# Patient Record
Sex: MALE | Race: WHITE | HISPANIC OR LATINO
[De-identification: names, ages, dates, MRNs, and addresses within clinical notes are randomized per-mention and may not be internally consistent; named-entity substitution may affect disease eponyms.]

---

## 2019-01-22 ENCOUNTER — TRANSCRIPTION ENCOUNTER (OUTPATIENT)
Age: 29
End: 2019-01-22

## 2019-06-19 ENCOUNTER — TRANSCRIPTION ENCOUNTER (OUTPATIENT)
Age: 29
End: 2019-06-19

## 2024-04-17 PROBLEM — Z00.00 ENCOUNTER FOR PREVENTIVE HEALTH EXAMINATION: Status: ACTIVE | Noted: 2024-04-17

## 2024-04-21 ENCOUNTER — NON-APPOINTMENT (OUTPATIENT)
Age: 34
End: 2024-04-21

## 2024-04-22 ENCOUNTER — NON-APPOINTMENT (OUTPATIENT)
Age: 34
End: 2024-04-22

## 2024-04-23 DIAGNOSIS — F41.9 ANXIETY DISORDER, UNSPECIFIED: ICD-10-CM

## 2024-04-23 DIAGNOSIS — Z87.19 PERSONAL HISTORY OF OTHER DISEASES OF THE DIGESTIVE SYSTEM: ICD-10-CM

## 2024-04-23 DIAGNOSIS — F32.A ANXIETY DISORDER, UNSPECIFIED: ICD-10-CM

## 2024-04-23 DIAGNOSIS — Z82.49 FAMILY HISTORY OF ISCHEMIC HEART DISEASE AND OTHER DISEASES OF THE CIRCULATORY SYSTEM: ICD-10-CM

## 2024-04-23 DIAGNOSIS — Q82.8 OTHER SPECIFIED CONGENITAL MALFORMATIONS OF SKIN: ICD-10-CM

## 2024-04-23 DIAGNOSIS — Z87.891 PERSONAL HISTORY OF NICOTINE DEPENDENCE: ICD-10-CM

## 2024-04-23 DIAGNOSIS — Z83.438 FAMILY HISTORY OF OTHER DISORDER OF LIPOPROTEIN METABOLISM AND OTHER LIPIDEMIA: ICD-10-CM

## 2024-04-23 DIAGNOSIS — Z78.9 OTHER SPECIFIED HEALTH STATUS: ICD-10-CM

## 2024-04-23 RX ORDER — GLYCOPYRROLATE 1 MG/1
1 TABLET ORAL
Refills: 0 | Status: ACTIVE | COMMUNITY

## 2024-04-23 RX ORDER — LORAZEPAM 1 MG/1
1 TABLET ORAL
Refills: 0 | Status: ACTIVE | COMMUNITY

## 2024-04-23 RX ORDER — FLUOXETINE HYDROCHLORIDE 10 MG/1
10 CAPSULE ORAL
Refills: 0 | Status: ACTIVE | COMMUNITY

## 2024-04-24 ENCOUNTER — APPOINTMENT (OUTPATIENT)
Dept: COLORECTAL SURGERY | Facility: CLINIC | Age: 34
End: 2024-04-24
Payer: COMMERCIAL

## 2024-04-24 VITALS
SYSTOLIC BLOOD PRESSURE: 124 MMHG | HEART RATE: 73 BPM | TEMPERATURE: 98.1 F | BODY MASS INDEX: 25.05 KG/M2 | WEIGHT: 173 LBS | DIASTOLIC BLOOD PRESSURE: 83 MMHG | HEIGHT: 69.5 IN

## 2024-04-24 PROCEDURE — 99203 OFFICE O/P NEW LOW 30 MIN: CPT

## 2024-04-24 NOTE — PLAN
[TextEntry] : - Will treat as a fissure for one month. - Fiber/water - Dilt/lido - Warm soaks - Cotton balls to keep area dry throughout the day. - RTC in 1 month.

## 2024-04-24 NOTE — PHYSICAL EXAM
[Abdomen Tenderness] : ~T No ~M abdominal tenderness [JVD] : no jugular venous distention  [Normal Breath Sounds] : Normal breath sounds [No Rash or Lesion] : No rash or lesion [Alert] : alert [Calm] : calm [de-identified] : Well appearing male in NAD [de-identified] : MMM [de-identified] : ROM WNL [FreeTextEntry1] : The pt was examined in the prone valorie-knife position with a medical assistant present for the entirety of the examination. Visual examination of the anal verge with effacement of the buttocks revealed a 1x 2cm ulceration of the posterior midline at the level of the verge with mildly rolled edges and no exposed sphincter and no signs of infection otherwise no masses, or skin rashes. Digital rectal exam revealed no palpable mass or blood with sphincter tone within normal limits. Anoscopy was deferred due to discomfort.  The patient tolerated the exam well.

## 2024-04-24 NOTE — HISTORY OF PRESENT ILLNESS
[FreeTextEntry1] : 34M presenting for consultation regarding a perianal wound/ulcer. Pt has a chronic hx of Pemphigus that normally leads to vesicles and subsequent ulcerations usually periorally, sometimes in the axilla or in the perineum however currently has been dealing with a posterior perianal ulceration since january. He reports discomfort with bowel movements and with wiping. Some spotted blood but no dripping. Denies straining or constipation. Evaluated by a Dermatologist who took a bx and r/o pemphigus. Has been infected twice and improved with topical gentamycin. Denies drainage but does report constant moisture.  PMH: IBS, Anxiety/Depression, Familial Benign Pemphigus (Ayanna Ayanna disease) Meds: Prozac, Glycopyrollate, Lorazepam All: NKDA Vaccine: Gardasil PSH: Denies FH: Denies CRC/IBD Cscope: 2011 WNL

## 2024-05-22 ENCOUNTER — APPOINTMENT (OUTPATIENT)
Dept: COLORECTAL SURGERY | Facility: CLINIC | Age: 34
End: 2024-05-22
Payer: COMMERCIAL

## 2024-05-22 VITALS
TEMPERATURE: 98.4 F | BODY MASS INDEX: 23.89 KG/M2 | HEIGHT: 69.5 IN | WEIGHT: 165 LBS | SYSTOLIC BLOOD PRESSURE: 142 MMHG | HEART RATE: 90 BPM | DIASTOLIC BLOOD PRESSURE: 80 MMHG

## 2024-05-22 PROCEDURE — 99212 OFFICE O/P EST SF 10 MIN: CPT

## 2024-05-22 NOTE — PHYSICAL EXAM
[Normal Breath Sounds] : Normal breath sounds [No Rash or Lesion] : No rash or lesion [Alert] : alert [Calm] : calm [Abdomen Tenderness] : ~T No ~M abdominal tenderness [JVD] : no jugular venous distention  [de-identified] : Well appearing male in NAD [de-identified] : MMM [de-identified] : ROM WNL [FreeTextEntry1] : The pt was examined in the prone valorie-knife position with a medical assistant present for the entirety of the examination. Visual examination of the anal verge with effacement of the buttocks revealed a nearly healed fissure of the posterior midline at the level of the verge without rolled edges and no exposed sphincter and no signs of infection otherwise no masses, or skin rashes. Digital rectal exam revealed no palpable mass or blood with sphincter tone within normal limits. Anoscopy was deferred due to discomfort.  The patient tolerated the exam well.

## 2024-05-22 NOTE — HISTORY OF PRESENT ILLNESS
[FreeTextEntry1] : 34M seen last month with a chronic perianal wound/ulcer. Pt has a chronic hx of Pemphigus that normally leads to vesicles and subsequent ulcerations usually periorally, sometimes in the axilla or in the perineum however currently has been dealing with a posterior perianal ulceration since january. On examination he was found to have a 1x2cm uleraction of the posterior midline at the level of the verge with mildly rolled edges and no exposed sphincter. He was recommended conservative measures with topical therapy and warm soaks and to follow up in 1 month. Today he presents for interval evaluation and reports near resolution of symptomatology. Some mild persistent discomfort, some spotting with BMs, bowel movements otherwise unaffected.   PMH: IBS, Anxiety/Depression, Familial Benign Pemphigus (Ayanna Ayanna disease) Meds: Prozac, Glycopyrollate, Lorazepam All: NKDA Vaccine: Gardasil PSH: Denies FH: Denies CRC/IBD Cscope: 2011 WNL

## 2024-05-22 NOTE — PLAN
[TextEntry] : - RTC in 1 month for completion anorectal exam. - Continue current regimen until repeat examination.

## 2024-06-27 ENCOUNTER — APPOINTMENT (OUTPATIENT)
Dept: COLORECTAL SURGERY | Facility: CLINIC | Age: 34
End: 2024-06-27
Payer: COMMERCIAL

## 2024-06-27 VITALS
BODY MASS INDEX: 24.61 KG/M2 | TEMPERATURE: 97.5 F | WEIGHT: 170 LBS | HEART RATE: 57 BPM | HEIGHT: 69.5 IN | DIASTOLIC BLOOD PRESSURE: 82 MMHG | SYSTOLIC BLOOD PRESSURE: 123 MMHG

## 2024-06-27 DIAGNOSIS — K60.2 ANAL FISSURE, UNSPECIFIED: ICD-10-CM

## 2024-06-27 PROCEDURE — 46600 DIAGNOSTIC ANOSCOPY SPX: CPT
